# Patient Record
Sex: MALE | Race: WHITE
[De-identification: names, ages, dates, MRNs, and addresses within clinical notes are randomized per-mention and may not be internally consistent; named-entity substitution may affect disease eponyms.]

---

## 2019-09-25 ENCOUNTER — HOSPITAL ENCOUNTER (EMERGENCY)
Dept: HOSPITAL 65 - ER | Age: 44
Discharge: HOME | End: 2019-09-25
Payer: COMMERCIAL

## 2019-09-25 VITALS — SYSTOLIC BLOOD PRESSURE: 118 MMHG | DIASTOLIC BLOOD PRESSURE: 68 MMHG

## 2019-09-25 VITALS
SYSTOLIC BLOOD PRESSURE: 144 MMHG | DIASTOLIC BLOOD PRESSURE: 70 MMHG | BODY MASS INDEX: 33.13 KG/M2 | WEIGHT: 250 LBS | HEIGHT: 73 IN

## 2019-09-25 DIAGNOSIS — Z79.899: ICD-10-CM

## 2019-09-25 DIAGNOSIS — I25.2: ICD-10-CM

## 2019-09-25 DIAGNOSIS — I10: Primary | ICD-10-CM

## 2019-09-25 DIAGNOSIS — R04.0: ICD-10-CM

## 2019-09-25 LAB
ALP INTEST CFR SERPL: 94 U/L (ref 50–136)
ALT SERPL-CCNC: 28 U/L (ref 12–78)
AST SERPL-CCNC: 20 U/L (ref 0–35)
BASOPHILS # BLD AUTO: 0 10^3/UL (ref 0–0.1)
BASOPHILS NFR BLD AUTO: 0.2 % (ref 0–0.2)
CALCIUM SERPL-MCNC: 8.4 MG/DL (ref 8.4–10.5)
CO2 BLDA-SCNC: 24.5 MMOL/L (ref 20–32)
EOSINOPHIL # BLD AUTO: 0.3 10^3/UL (ref 0–0.2)
EOSINOPHIL NFR BLD AUTO: 2.8 % (ref 0–5)
ERYTHROCYTE [DISTWIDTH] IN BLOOD BY AUTOMATED COUNT: 12.6 % (ref 11.5–14.5)
GLUCOSE PRE 100 G GLC PO SERPL-MCNC: 103 MG/DL (ref 70–110)
HGB BLD-MCNC: 15.2 G/DL (ref 13.9–16.3)
LYMPHOCYTES # BLD AUTO: 1.8 10^3/UL (ref 1–4.8)
LYMPHOCYTES NFR BLD AUTO: 20.3 % (ref 24–44)
MCH RBC QN AUTO: 30.8 PG (ref 26–34)
MCHC RBC AUTO-ENTMCNC: 33.2 G/DL (ref 33–37)
MCV RBC AUTO: 92.7 FL (ref 78–100)
MONOCYTES # BLD AUTO: 0.8 10^3/UL (ref 0.3–0.8)
MONOCYTES NFR BLD AUTO: 8.8 % (ref 5–12)
NEUTROPHILS # BLD AUTO: 6 10^3/UL (ref 1.8–7.7)
NEUTROPHILS NFR BLD AUTO: 67.8 % (ref 41–85)
PLATELET # BLD AUTO: 186 10^3/UL (ref 150–400)
PMV BLD AUTO: 10 FL (ref 7.8–11)
WBC # BLD AUTO: 8.8 10^3/UL (ref 4.5–11)

## 2019-09-25 PROCEDURE — 83880 ASSAY OF NATRIURETIC PEPTIDE: CPT

## 2019-09-25 PROCEDURE — 80053 COMPREHEN METABOLIC PANEL: CPT

## 2019-09-25 PROCEDURE — 85025 COMPLETE CBC W/AUTO DIFF WBC: CPT

## 2019-09-25 PROCEDURE — 84484 ASSAY OF TROPONIN QUANT: CPT

## 2019-09-25 PROCEDURE — 71045 X-RAY EXAM CHEST 1 VIEW: CPT

## 2019-09-25 PROCEDURE — 85730 THROMBOPLASTIN TIME PARTIAL: CPT

## 2019-09-25 PROCEDURE — 36415 COLL VENOUS BLD VENIPUNCTURE: CPT

## 2019-09-25 PROCEDURE — 93005 ELECTROCARDIOGRAM TRACING: CPT

## 2019-09-25 PROCEDURE — 99285 EMERGENCY DEPT VISIT HI MDM: CPT

## 2019-09-25 PROCEDURE — 70450 CT HEAD/BRAIN W/O DYE: CPT

## 2019-09-25 PROCEDURE — 85610 PROTHROMBIN TIME: CPT

## 2019-09-25 NOTE — ER.PDOC
General


Chief Complaint:  Requesting Medical Care


Stated Complaint:  HTN, HEADACHE


Time seen by MD:  20:25


Source:  patient, family, EMS


Exam Limitations:  no limitations





History of Present Illness


Initial Comments


43 YO MALE TERRY IN BY EMS FOR ELEVATED BP, HEADACHE TODAY WHILE AT WORK. HE 

STATES LEFT FRONTAL HEADACHE AND HE FELT OFF. ASSOCIATED EPISTAXIS WHICH HAS 

RESOLVED. NO CHEST PAIN OR SHORTNESS OF BREATH. HE IS COMPLIANT WITH HIS 

MEDICATIONS. BP AT WORK /99 MMHG


Timing/Duration:  4-6 hours


Severity/Quality:  moderate, achy


Prior Headaches/Recent Trauma:  no recent headache/trauma


Associated Symptoms:  other (EPISTAXIS, FELT OFF)





Past Medical History


Medical History:  heart attack, hypertension


Surgical History:  cardiac cath, angioplasty, other (KNEE SURGERY)





Family History


Significant Family History:  no pertinent family hx





Social History


Smoking:  cigarettes


Alcohol Use:  occassionally


Drug Use:  none





Reviewed


Nursing Reviewed:  Vital Signs, Abn. Noted, Nursing Assessment





Review of Systems


Constitutional:  denies chills, denies fever


Eyes:  denies blurred vision


Ears, Nose, Mouth, Throat:  see HPI


Respiratory:  denies cough, denies orthopnea, denies shortness of breath


Cardiovascular:  denies chest pain, denies edema, denies palpitations


Gastrointestinal:  denies abdominal pain, denies nausea, denies vomiting


Genitourinary:  see HPI


Musculoskeletal:  denies back pain


Skin:  denies change in color


Psychiatric/Neurological:  see HPI





Physical Exam


General Appearance:  Anxious


Head/Eyes:  eyes nml inspection, no facial swelling, PERRL


ENT:  nml ENT inspection


Neck:  nml inspection, Supple


Cardiovascular:  Normal Peripheral Pulses, Regular Rate, Rhythm, No Edema, No 

JVD


Respiratory:  chest non-tender, lungs clear, normal breath sounds


Gastrointestinal:  Normal Bowel Sounds


Back:  Normal Inspection


Psychiatric:  Alert, Oriented x 3


Cranial Nerves:  Normal Speech, PERRL


Coordination/Gait:  Normal Finger to Nose, Normal Gait


Motor/Sensory:  No Motor Deficit, No Sensory Deficit


Skin:  Warm/Dry





Results/Orders


Results/Orders





Orders - GERARDO WALTERS MD


Ekg-Routine (9/25/19 20:28)


Cbc With Auto Diff (9/25/19 20:41)


Comprehensive Metabolic Panel (9/25/19 20:41)


Troponin I (9/25/19 20:41)


Probnp    B-Type Np (9/25/19 20:41)


PT (9/25/19 20:41)


Partial Thromboplastin Time. (9/25/19 20:41)


Xr Chest 1v (9/25/19 20:41)


Saline Lock (9/25/19 20:41)


Ct Head Wo Contrast (9/25/19 20:41)


Clonidine Hcl (Catapres) (9/25/19 20:41)





Vital Signs








  Date Time  Temp Pulse Resp B/P (MAP) Pulse Ox O2 Delivery O2 Flow Rate FiO2


 


9/25/19 20:54  75  152/94    








Administered Medications








 Medications


  (Trade)  Dose


 Ordered  Sig/Last


 Route


 PRN Reason  Start Time


 Stop Time Status Last Admin


Dose Admin


 


 Clonidine


  (Catapres)  0.1 mg  STAT  STAT


 PO


   9/25/19 20:41


 9/25/19 20:42 UNV 9/25/19 20:54


0.1 MG








                                Laboratory Tests








Test


 9/25/19


20:50


 


White Blood Count


 8.8 10^3/uL


(4.5-11.0)


 


Red Blood Count


 4.94 10^6/uL


(4.50-5.90)


 


Hemoglobin


 15.2 g/dL


(13.9-16.3)


 


Hematocrit


 45.8 %


(37.0-53.0)


 


Mean Corpuscular Volume


 92.7 fL


()


 


Mean Corpuscular Hemoglobin


 30.8 pg


(26-34)


 


Mean Corpuscular Hemoglobin


Concent 33.2 g/dL


(33-37)


 


Red Cell Distribution Width


 12.6 %


(11.5-14.5)


 


Platelet Count


 186 10^3/uL


(150-400)


 


Mean Platelet Volume


 10.0 fL


(7.8-11.0)


 


Neutrophils (%) (Auto)


 67.8 %


(41.0-85.0)


 


Lymphocytes (%) (Auto)


 20.3 %


(24.0-44.0)  L


 


Monocytes (%) (Auto)


 8.8 %


(5.0-12.0)


 


Neutrophils # (Auto)


 6.0 10^3/uL


(1.8-7.7)


 


Lymphocytes # (Auto)


 1.8 10^3/uL


(1.0-4.8)


 


Monocytes # (Auto)


 0.8 10^3/uL


(0.3-0.8)


 


Absolute Immature Granulocyte


(auto 0.01 10^3 u/L


(0-2)


 


Immature Granulocytes %


 0.10 %


(0.00-0.50)


 


Eosinophils %


 2.8 %


(0.0-5.0)


 


Basophils %


 0.2 %


(0.0-0.2)


 


Basophils #


 0.0 10^3/uL


(0.0-0.1)


 


Eosinophil Count


 0.3 10^3/uL


(0.0-0.2)  H


 


Prothrombin Time


 10.4 SEC


(9.4-11.5)


 


Prothrombin Time INR


(Non-Therap) 1.0  





 


Activated Partial


Thromboplast Time 22.9 SEC


(24.67-30.72)


 


Sodium Level


 141 mmol/L


(132-145)


 


Potassium Level


 3.9 mmol/L


(3.6-5.2)


 


Chloride Level


 106.0 mmol/L


()


 


Carbon Dioxide Level


 24.5 mmol/L


(20.0-32)


 


Anion Gap 14.4  


 


Blood Urea Nitrogen


 18 mg/dL


(7-18)


 


Creatinine


 1.11 mg/dL


(0.59-1.40)


 


Estimated GFR (


American) 86.3 (>/=60)  





 


BUN/Creatinine Ratio 16.0  


 


Glucose Level


 103 mg/dL


()


 


Calcium Level


 8.4 mg/dL


(8.4-10.5)


 


Total Bilirubin


 0.4 mg/dL


(0.2-1.0)


 


Aspartate Amino Transferase


(AST) 20 U/L (0-35)  





 


Alanine Aminotransferase (ALT)


 28 U/L (12-78)





 


Alkaline Phosphatase


 94 U/L


()


 


Troponin I


 < 0.02 ng/mL


(0.00-0.05)


 


Pro-B-Type Natriuretic Peptide


 132 pg/mL


(0-125)  H


 


Total Protein


 6.8 g/dL


(6.4-8.2)


 


Albumin


 3.5 g/dL


(3.4-5.0)


 


Globulin 3.3  











Progress


Progress


FEELING BETTER. LABS AND IMAGING UNREMARKABLE. PATIENT WANTS TO GO HOME BP 

123/90. NO EPISTAXIS. TO FOLLOW UP WITH HIS PCP TO APRIL.





EKG/XRAY/CT/US


EKG:  NSR (66 BEATS/MIN)


XRAY:  chest


XRAY Comments:   Unremarkable chest radiograph.


CT Comments:   No acute intracranial hemorrhage or mass effect.





Course


Vitals & review Data





Vital Sign - Last 24 Hours








 9/25/19





 20:54


 


Pulse 75


 


B/P (MAP) 152/94








Laboratory Tests








Test


 9/25/19


20:50


 


White Blood Count 8.8 10^3/uL 


 


Red Blood Count 4.94 10^6/uL 


 


Hemoglobin 15.2 g/dL 


 


Hematocrit 45.8 % 


 


Mean Corpuscular Volume 92.7 fL 


 


Mean Corpuscular Hemoglobin 30.8 pg 


 


Mean Corpuscular Hemoglobin


Concent 33.2 g/dL 





 


Red Cell Distribution Width 12.6 % 


 


Platelet Count 186 10^3/uL 


 


Mean Platelet Volume 10.0 fL 


 


Neutrophils (%) (Auto) 67.8 % 


 


Lymphocytes (%) (Auto) 20.3 % 


 


Monocytes (%) (Auto) 8.8 % 


 


Neutrophils # (Auto) 6.0 10^3/uL 


 


Lymphocytes # (Auto) 1.8 10^3/uL 


 


Monocytes # (Auto) 0.8 10^3/uL 


 


Absolute Immature Granulocyte


(auto 0.01 10^3 u/L 





 


Immature Granulocytes % 0.10 % 


 


Eosinophils % 2.8 % 


 


Basophils % 0.2 % 


 


Basophils # 0.0 10^3/uL 


 


Eosinophil Count 0.3 10^3/uL 


 


Prothrombin Time 10.4 SEC 


 


Prothrombin Time INR


(Non-Therap) 1.0 





 


Activated Partial


Thromboplast Time 22.9 SEC 





 


Sodium Level 141 mmol/L 


 


Potassium Level 3.9 mmol/L 


 


Chloride Level 106.0 mmol/L 


 


Carbon Dioxide Level 24.5 mmol/L 


 


Anion Gap 14.4 


 


Blood Urea Nitrogen 18 mg/dL 


 


Creatinine 1.11 mg/dL 


 


Estimated GFR (


American) 86.3 





 


BUN/Creatinine Ratio 16.0 


 


Glucose Level 103 mg/dL 


 


Calcium Level 8.4 mg/dL 


 


Total Bilirubin 0.4 mg/dL 


 


Aspartate Amino Transf


(AST/SGOT) 20 U/L 





 


Alanine Aminotransferase


(ALT/SGPT) 28 U/L 





 


Alkaline Phosphatase 94 U/L 


 


Troponin I < 0.02 ng/mL 


 


Pro-B-Type Natriuretic Peptide 132 pg/mL 


 


Total Protein 6.8 g/dL 


 


Albumin 3.5 g/dL 


 


Globulin 3.3 








                               Current Medications








 Medications


  (Trade)  Dose


 Ordered  Sig/Last


 PRN Reason  Start Time


 Stop Time Status Last Admin


 


 Clonidine


  (Catapres)  0.1 mg  STAT  STAT


   9/25/19 20:41


 9/25/19 20:42 UNV 9/25/19 20:54














Departure


Time of Disposition:  22:51


Disposition:  01 HOME, SELF-CARE


Impression:  


   Primary Impression:  


   Hypertension


   Additional Impression:  


   Headache


Condition:  Improved





Additional Instructions:  


FOLLOW UP WITH DR. THOMAS FOR RECHECK IN 1-2 DAYS. RETURN TO THE ER IF WORSENING


Duration or Time Spent with Pa:  60 MIN





Problem Qualifiers











GERARDO WALTERS MD              Sep 25, 2019 20:45

## 2019-09-25 NOTE — PCM.EKG
[facility]

                                       

Test Date:    2019               Test Time:    20:06:05

Pat Name:     DANGELO LEVY              Department:   

Patient ID:   [id number]          Room:          

Gender:       M                        Technician:   NANI

:          [id number]               Requested By: GERARDO WALTERS

Order Number: [id number]           Reading MD:     

                                 Measurements

Intervals                              Axis          

Rate:         66                       P:            45

NE:           154                      QRS:          26

QRSD:         92                       T:            20

QT:           346                                    

QTc:          362                                    

                           Interpretive Statements

Normal sinus rhythm with sinus arrhythmia

Normal ECG

No previous ECG available for comparison



Please click the below link to view image of tracing.

## 2019-09-25 NOTE — DIREP
PROCEDURE:CHEST 1 VIEW

 

COMPARISON:None.

 

INDICATIONS:HTN

 

FINDINGS:

LUNGS/PLEURA:No confluent pulmonary infiltrate. No effusions.  No 

pneumothorax.

VASCULATURE:Unremarkable pulmonary vasculature.

CARDIAC:No cardiac silhouette abnormality or cardiomegaly. 

MEDIASTINUM:No visible mass or adenopathy. 

BONES:No fracture or visible bony lesion. 

OTHER:Negative.  

 

CONCLUSION:

1. Unremarkable chest radiograph.

 

 

 

Dictated by: Sarah Amin MD on 09/25/2019 at 09:59 PM     

Electronically Signed By: Sarah Amin MD on 09/25/2019 at 09:59 PM

## 2019-09-25 NOTE — DIREP
PROCEDURE: CT HEAD BRAIN W/O CONTRAST

 

TECHNIQUE:Contiguous 5.0 mm transaxial sections were obtained from the vertex 

to skull base without the use of intravenous contrast.

 

COMPARISON:None.

 

INDICATIONS:HEADACHE, HTN

 

FINDINGS:

VENTRICLES:Within normal limits

CEREBRUM:No acute intracranial hemorrhage or mass effect.  Gray-white matter 

differentiation within normal limits.

CEREBELLUM:Normal.

BRAINSTEM:Normal.

SKULL:Normal.

SINUSES:Well pneumatized.

OTHER:Negative.

 

CONCLUSION:

1. No acute intracranial hemorrhage or mass effect.

 

 

 

Dictated by: Miguel Santiago MD on 09/25/2019 at 10:07 PM     

Electronically Signed By: Miguel Santiago MD on 09/25/2019 at 10:08 PM

## 2019-09-26 VITALS — DIASTOLIC BLOOD PRESSURE: 70 MMHG | SYSTOLIC BLOOD PRESSURE: 144 MMHG

## 2020-02-24 ENCOUNTER — HOSPITAL ENCOUNTER (EMERGENCY)
Dept: HOSPITAL 65 - ER | Age: 45
Discharge: HOME | End: 2020-02-24
Payer: COMMERCIAL

## 2020-02-24 VITALS — SYSTOLIC BLOOD PRESSURE: 130 MMHG | DIASTOLIC BLOOD PRESSURE: 92 MMHG

## 2020-02-24 VITALS — WEIGHT: 260 LBS | HEIGHT: 68 IN | BODY MASS INDEX: 39.4 KG/M2

## 2020-02-24 VITALS — SYSTOLIC BLOOD PRESSURE: 130 MMHG | DIASTOLIC BLOOD PRESSURE: 85 MMHG

## 2020-02-24 VITALS — DIASTOLIC BLOOD PRESSURE: 85 MMHG | SYSTOLIC BLOOD PRESSURE: 130 MMHG

## 2020-02-24 VITALS — SYSTOLIC BLOOD PRESSURE: 132 MMHG | DIASTOLIC BLOOD PRESSURE: 92 MMHG

## 2020-02-24 DIAGNOSIS — I10: Primary | ICD-10-CM

## 2020-02-24 LAB
ALP INTEST CFR SERPL: 79 U/L (ref 50–136)
ALT SERPL-CCNC: 33 U/L (ref 12–78)
AST SERPL-CCNC: 22 U/L (ref 0–35)
CALCIUM SERPL-MCNC: 8.6 MG/DL (ref 8.4–10.5)
CO2 BLDA-SCNC: 27.8 MMOL/L (ref 20–32)
GLUCOSE PRE 100 G GLC PO SERPL-MCNC: 113 MG/DL (ref 70–110)

## 2020-02-24 PROCEDURE — 36415 COLL VENOUS BLD VENIPUNCTURE: CPT

## 2020-02-24 PROCEDURE — 99283 EMERGENCY DEPT VISIT LOW MDM: CPT

## 2020-02-24 PROCEDURE — 80053 COMPREHEN METABOLIC PANEL: CPT

## 2020-02-24 NOTE — ER.PDOC
General


Chief Complaint:  General Complaint


Stated Complaint:  BLOOD PRESSURE


TRAVEL OUT OF US:  No


Time seen by MD:  13:48


Source:  patient


Exam Limitations:  no limitations





History of Present Illness


Initial Comments


Shaky feeling today for a few seconds, Pt states I mentioned it to my boss and 

he sent me here. Pt states he ate breakfast at 4 am today. Pt states "I feel 

shaky because I cannot sleep and I work too many hours."  Pt denies chest pain, 

sweating, neck pain, shortness of breath, back pain. Pt denies numbness. Pt 

states he was cleared by Dr Menendez his Cardiologist 6 months ago


Timing/Duration:  other


Severity:  mild





Past Medical History


Medical History:  hypertension, other


Surgical History:  knee, other





Social History


Alcohol Use:  none


Drug Use:  none





Review of Systems


Constitutional:  no symptoms reported


EENTM:  no symptoms reported


Respiratory:  no symptoms reported


Cardiovascular:  no symptoms reported


Gastrointestinal:  no symptoms reported


Genitourinary:  no symptoms reported


Musculoskeletal:  no symptoms reported


Skin:  no symptoms reported


Psychiatric/Neurological:  other


Hematologic/Lymphatic:  no symptoms reported


All Other Systems:  Reviewed and Negative





Physical Exam


General Appearance:  No Apparent Distress, WD/WN


EENT:  eyes nml inspection, nml ENT inspection


Neck:  Non-Tender, Full Range of Motion, Supple, Normal Inspection


Respiratory:  chest non-tender, lungs clear, normal breath sounds, no 

respiratory distress, no accessory muscle use


CVS:  reg rate & rhythm, no murmur, no gallop, pulses nml, nml capillary refill


Gastrointestinal:  Normal Bowel Sounds, No Organomegaly, No Pulsatile Mass, Non 

Tender


Extremities:  Normal Range of Motion, Non-Tender, Normal Inspection, No Pedal 

Edema, No Calf Tenderness


Neurologic/Psychiatric:  No Motor/Sensory Deficits, Alert, Normal Mood/Affect


Skin:  Normal Color, Warm/Dry


Lymphatic:  No Adenopathy





Results/Orders


Results/Orders





Orders - JESUS STEINER NP


Comprehensive Metabolic Panel (2/24/20 14:08)





Vital Signs








  Date Time  Temp Pulse Resp B/P (MAP) Pulse Ox O2 Delivery O2 Flow Rate FiO2


 


2/24/20 15:06    132/92 (105)    


 


2/24/20 13:46    130/92 (105)    


 


2/24/20 13:22 98.2 69 16 130/85 (100) 99 Room Air  


 


2/24/20 13:22 98.2 69 16     


 


2/24/20 12:55 98.2 69 16  99   








                                Laboratory Tests








Test


 2/24/20


14:20


 


Sodium Level


 140 mmol/L


(132-145)


 


Potassium Level


 4.2 mmol/L


(3.6-5.2)


 


Chloride Level


 104.0 mmol/L


()


 


Carbon Dioxide Level


 27.8 mmol/L


(20.0-32)


 


Anion Gap 12.4  


 


Blood Urea Nitrogen


 15 mg/dL


(7-18)


 


Creatinine


 1.06 mg/dL


(0.59-1.40)


 


Estimated GFR (


American) 91.0 (>/=60)  





 


Est GFR (CKD-EPI)(Non-Afr


American) 75.2 (>/=60)  





 


BUN/Creatinine Ratio 14.0  


 


Glucose Level


 113 mg/dL


()  H


 


Calcium Level


 8.6 mg/dL


(8.4-10.5)


 


Total Bilirubin


 0.5 mg/dL


(0.2-1.0)


 


Aspartate Amino Transferase


(AST) 22 U/L (0-35)  





 


Alanine Aminotransferase (ALT)


 33 U/L (12-78)





 


Alkaline Phosphatase


 79 U/L


()


 


Total Protein


 7.1 g/dL


(6.4-8.2)


 


Albumin


 3.7 g/dL


(3.4-5.0)


 


Globulin 3.4  











Course


Duration or Total Time Spent w:  60 MIN


Vitals & review Data





Vital Sign - Last 24 Hours








 2/24/20 2/24/20 2/24/20 2/24/20





 12:55 13:22 13:22 13:46


 


Temp 98.2 98.2 98.2 


 


Pulse 69 69 69 


 


Resp 16 16 16 


 


B/P (MAP)   130/85 (100) 130/92 (105)


 


Pulse Ox 99  99 


 


O2 Delivery   Room Air 


 


    





 2/24/20   





 15:06   


 


B/P (MAP) 132/92 (105)   








Laboratory Tests








Test


 2/24/20


14:20


 


Sodium Level 140 mmol/L 


 


Potassium Level 4.2 mmol/L 


 


Chloride Level 104.0 mmol/L 


 


Carbon Dioxide Level 27.8 mmol/L 


 


Anion Gap 12.4 


 


Blood Urea Nitrogen 15 mg/dL 


 


Creatinine 1.06 mg/dL 


 


Estimated GFR (


American) 91.0 





 


Est GFR (CKD-EPI)(Non-Afr


American) 75.2 





 


BUN/Creatinine Ratio 14.0 


 


Glucose Level 113 mg/dL 


 


Calcium Level 8.6 mg/dL 


 


Total Bilirubin 0.5 mg/dL 


 


Aspartate Amino Transf


(AST/SGOT) 22 U/L 





 


Alanine Aminotransferase


(ALT/SGPT) 33 U/L 





 


Alkaline Phosphatase 79 U/L 


 


Total Protein 7.1 g/dL 


 


Albumin 3.7 g/dL 


 


Globulin 3.4 








Sepsis Infection Criteria Pres:  None


O2 Sat by Pulse Oximetry:  99





Departure


Time of Disposition:  15:02


Disposition:  01 HOME, SELF-CARE


Impression:  


   Primary Impression:  


   Normal exam


Condition:  Stable


Patient Instructions:  Form - Return To Work


Referrals:  


JAIRO BRITTON DO (PCP)


PRIMARY CARE PROVIDER





Additional Instructions:  


See PCP this week for follow up for issues of not sleeping and working too many 

hours. If chest pain, shortness of breath, diaphoresis, neck pain and or any 

other concerns return to the ER


Duration or Time Spent with Pa:  18 minutes





Return to Work/School


Can a patient return to work?:  No


Can a patient return to school:  No











JESUS STEINER NP          Feb 24, 2020 14:04

## 2020-02-24 NOTE — NUR
TRIAGE ASSESSEMENT

AFTER TRIAGE ASSESSEMENT COMPLETED THIS NURSE CONSULTED WITH DR WILDER ON 
PLACEMENT OF PT. DR WILDER STATED THIS PT WAS A FAST PASS ELIGABLE PT.